# Patient Record
Sex: MALE | Race: WHITE | Employment: OTHER | URBAN - METROPOLITAN AREA
[De-identification: names, ages, dates, MRNs, and addresses within clinical notes are randomized per-mention and may not be internally consistent; named-entity substitution may affect disease eponyms.]

---

## 2017-07-12 ENCOUNTER — LAB ENCOUNTER (OUTPATIENT)
Dept: LAB | Age: 67
End: 2017-07-12
Attending: FAMILY MEDICINE
Payer: COMMERCIAL

## 2017-07-12 ENCOUNTER — OFFICE VISIT (OUTPATIENT)
Dept: FAMILY MEDICINE CLINIC | Facility: CLINIC | Age: 67
End: 2017-07-12

## 2017-07-12 VITALS
TEMPERATURE: 97 F | BODY MASS INDEX: 32.78 KG/M2 | HEART RATE: 64 BPM | HEIGHT: 72 IN | RESPIRATION RATE: 12 BRPM | SYSTOLIC BLOOD PRESSURE: 130 MMHG | DIASTOLIC BLOOD PRESSURE: 78 MMHG | WEIGHT: 242 LBS

## 2017-07-12 DIAGNOSIS — R73.9 HYPERGLYCEMIA: ICD-10-CM

## 2017-07-12 DIAGNOSIS — Z00.00 HEALTH CARE MAINTENANCE: ICD-10-CM

## 2017-07-12 DIAGNOSIS — Z00.00 HEALTH CARE MAINTENANCE: Primary | ICD-10-CM

## 2017-07-12 DIAGNOSIS — F17.200 SMOKER: ICD-10-CM

## 2017-07-12 LAB
ALBUMIN SERPL-MCNC: 3.9 G/DL (ref 3.5–4.8)
ALP LIVER SERPL-CCNC: 82 U/L (ref 45–117)
ALT SERPL-CCNC: 50 U/L (ref 17–63)
AST SERPL-CCNC: 24 U/L (ref 15–41)
BASOPHILS # BLD AUTO: 0.06 X10(3) UL (ref 0–0.1)
BASOPHILS NFR BLD AUTO: 1 %
BILIRUB SERPL-MCNC: 0.5 MG/DL (ref 0.1–2)
BILIRUB UR QL STRIP.AUTO: NEGATIVE
BUN BLD-MCNC: 20 MG/DL (ref 8–20)
CALCIUM BLD-MCNC: 8.4 MG/DL (ref 8.3–10.3)
CHLORIDE: 110 MMOL/L (ref 101–111)
CHOLEST SMN-MCNC: 141 MG/DL (ref ?–200)
CO2: 26 MMOL/L (ref 22–32)
COMPLEXED PSA SERPL-MCNC: 0.87 NG/ML (ref 0.01–4)
CREAT BLD-MCNC: 1.06 MG/DL (ref 0.7–1.3)
EOSINOPHIL # BLD AUTO: 0.17 X10(3) UL (ref 0–0.3)
EOSINOPHIL NFR BLD AUTO: 3 %
ERYTHROCYTE [DISTWIDTH] IN BLOOD BY AUTOMATED COUNT: 14.2 % (ref 11.5–16)
GLUCOSE BLD-MCNC: 118 MG/DL (ref 70–99)
GLUCOSE UR STRIP.AUTO-MCNC: NEGATIVE MG/DL
HCT VFR BLD AUTO: 46 % (ref 37–53)
HDLC SERPL-MCNC: 50 MG/DL (ref 45–?)
HDLC SERPL: 2.82 {RATIO} (ref ?–4.97)
HGB BLD-MCNC: 14.6 G/DL (ref 13–17)
IMMATURE GRANULOCYTE COUNT: 0.02 X10(3) UL (ref 0–1)
IMMATURE GRANULOCYTE RATIO %: 0.3 %
KETONES UR STRIP.AUTO-MCNC: NEGATIVE MG/DL
LDLC SERPL CALC-MCNC: 82 MG/DL (ref ?–130)
LEUKOCYTE ESTERASE UR QL STRIP.AUTO: NEGATIVE
LYMPHOCYTES # BLD AUTO: 1.58 X10(3) UL (ref 0.9–4)
LYMPHOCYTES NFR BLD AUTO: 27.5 %
M PROTEIN MFR SERPL ELPH: 7 G/DL (ref 6.1–8.3)
MCH RBC QN AUTO: 27.2 PG (ref 27–33.2)
MCHC RBC AUTO-ENTMCNC: 31.7 G/DL (ref 31–37)
MCV RBC AUTO: 85.7 FL (ref 80–99)
MONOCYTES # BLD AUTO: 0.46 X10(3) UL (ref 0.1–0.6)
MONOCYTES NFR BLD AUTO: 8 %
NEUTROPHIL ABS PRELIM: 3.46 X10 (3) UL (ref 1.3–6.7)
NEUTROPHILS # BLD AUTO: 3.46 X10(3) UL (ref 1.3–6.7)
NEUTROPHILS NFR BLD AUTO: 60.2 %
NITRITE UR QL STRIP.AUTO: NEGATIVE
NONHDLC SERPL-MCNC: 91 MG/DL (ref ?–130)
PH UR STRIP.AUTO: 5 [PH] (ref 4.5–8)
PLATELET # BLD AUTO: 187 10(3)UL (ref 150–450)
POTASSIUM SERPL-SCNC: 4.1 MMOL/L (ref 3.6–5.1)
PROT UR STRIP.AUTO-MCNC: NEGATIVE MG/DL
RBC # BLD AUTO: 5.37 X10(6)UL (ref 4.3–5.7)
RBC UR QL AUTO: NEGATIVE
RED CELL DISTRIBUTION WIDTH-SD: 43.8 FL (ref 35.1–46.3)
SODIUM SERPL-SCNC: 142 MMOL/L (ref 136–144)
SP GR UR STRIP.AUTO: 1.02 (ref 1–1.03)
TRIGLYCERIDES: 43 MG/DL (ref ?–150)
TSI SER-ACNC: 2.05 MIU/ML (ref 0.35–5.5)
UROBILINOGEN UR STRIP.AUTO-MCNC: <2 MG/DL
VLDL: 9 MG/DL (ref 5–40)
WBC # BLD AUTO: 5.8 X10(3) UL (ref 4–13)

## 2017-07-12 PROCEDURE — 90715 TDAP VACCINE 7 YRS/> IM: CPT | Performed by: FAMILY MEDICINE

## 2017-07-12 PROCEDURE — 80050 GENERAL HEALTH PANEL: CPT | Performed by: FAMILY MEDICINE

## 2017-07-12 PROCEDURE — 90471 IMMUNIZATION ADMIN: CPT | Performed by: FAMILY MEDICINE

## 2017-07-12 PROCEDURE — 99386 PREV VISIT NEW AGE 40-64: CPT | Performed by: FAMILY MEDICINE

## 2017-07-12 PROCEDURE — 93000 ELECTROCARDIOGRAM COMPLETE: CPT | Performed by: FAMILY MEDICINE

## 2017-07-12 PROCEDURE — 84153 ASSAY OF PSA TOTAL: CPT | Performed by: FAMILY MEDICINE

## 2017-07-12 PROCEDURE — 36415 COLL VENOUS BLD VENIPUNCTURE: CPT | Performed by: FAMILY MEDICINE

## 2017-07-12 PROCEDURE — 80061 LIPID PANEL: CPT | Performed by: FAMILY MEDICINE

## 2017-07-12 PROCEDURE — 81003 URINALYSIS AUTO W/O SCOPE: CPT | Performed by: FAMILY MEDICINE

## 2017-07-12 PROCEDURE — 83036 HEMOGLOBIN GLYCOSYLATED A1C: CPT | Performed by: FAMILY MEDICINE

## 2017-07-12 NOTE — PATIENT INSTRUCTIONS
EKG and blood test done today. Boostrix vaccine (tetanus) given today. Recommend colonoscopy for colon cancer screening.   Recommend sleep study (can be done here by seeing Dr. Nuha Shepherd)  Recommend stop smoking

## 2017-07-12 NOTE — PROGRESS NOTES
[address]  PROGRESS NOTE  Chief Complaint:   Patient presents with: Well Adult      HPI:   This is a 61year old male coming in for general wellness. Patient moved to to the area here from [facility] 6 years ago.   He has not h Ears, Nose, Throat:  Denies hearing loss, sneezing, congestion, runny nose or sore throat. INTEGUMENTARY:  Denies rashes, itching, skin lesion, or excessive skin dryness.   CARDIOVASCULAR:  Denies chest pain, chest pressure, chest discomfort, palpitations, cyanosis, few varicosities noted. Left ankle is mildly swollen from recent fracture last month. Genitalia: Normal testes and penis. No inguinal hernias. Rectal exam: Prostate is normal.  Stool is negative for occult blood. No masses.       ASSESSMENT A

## 2017-07-13 DIAGNOSIS — R73.9 HYPERGLYCEMIA: Primary | ICD-10-CM

## 2017-07-13 LAB
EST. AVERAGE GLUCOSE BLD GHB EST-MCNC: 120 MG/DL (ref 68–126)
HBA1C MFR BLD HPLC: 5.8 % (ref ?–5.7)

## 2017-07-14 ENCOUNTER — TELEPHONE (OUTPATIENT)
Dept: FAMILY MEDICINE CLINIC | Facility: CLINIC | Age: 67
End: 2017-07-14

## 2017-07-14 NOTE — TELEPHONE ENCOUNTER
----- Message from Cornelio Severe, MD sent at 7/14/2017  9:09 AM CDT -----  Labs are completely normal with exception of an FBS of 118. Additional A1c test done was 5.8 indicating that he has very early pre-diabetes.   He needs to watch diets and weight h

## 2017-11-27 PROBLEM — M76.62 ACHILLES TENDINITIS OF LEFT LOWER EXTREMITY: Status: ACTIVE | Noted: 2017-11-27

## 2018-03-07 ENCOUNTER — OFFICE VISIT (OUTPATIENT)
Dept: FAMILY MEDICINE CLINIC | Facility: CLINIC | Age: 68
End: 2018-03-07

## 2018-03-07 ENCOUNTER — TELEPHONE (OUTPATIENT)
Dept: FAMILY MEDICINE CLINIC | Facility: CLINIC | Age: 68
End: 2018-03-07

## 2018-03-07 VITALS
TEMPERATURE: 97 F | RESPIRATION RATE: 16 BRPM | SYSTOLIC BLOOD PRESSURE: 148 MMHG | DIASTOLIC BLOOD PRESSURE: 72 MMHG | HEIGHT: 74 IN | HEART RATE: 70 BPM | BODY MASS INDEX: 29.52 KG/M2 | WEIGHT: 230 LBS

## 2018-03-07 DIAGNOSIS — S76.212A GROIN STRAIN, LEFT, INITIAL ENCOUNTER: Primary | ICD-10-CM

## 2018-03-07 PROCEDURE — 99213 OFFICE O/P EST LOW 20 MIN: CPT | Performed by: FAMILY MEDICINE

## 2018-03-07 NOTE — TELEPHONE ENCOUNTER
Pt states he is having groin pain at the top of the thigh. Pt was cleaning out pipes and now has the groin pain. Pt has tried ice which helped a little. Pt has Meloxicam at home and can take for the pain if you think it will help.     Can pt take t

## 2018-03-07 NOTE — TELEPHONE ENCOUNTER
Pt has called and schedule appt for today.      Future Appointments  Date Time Provider Jarod Thomas   3/7/2018 3:00 PM Stephen Renteria MD [facility]

## 2018-03-07 NOTE — TELEPHONE ENCOUNTER
coming in for groin pain @ 430pm tomorrow  - is out of town. wondering what to do for the pain in the interm.     Please advise

## 2018-03-07 NOTE — TELEPHONE ENCOUNTER
I prefer patient try Tylenol and/or ibuprofen for the pain rather than meloxicam.  Agree with appointment tomorrow and emergency room if worsens.

## 2018-03-08 NOTE — PATIENT INSTRUCTIONS
Rest as much as possible. Limit walking and exertion. May use ice after work but at other times then use heat. Tylenol/ibuprofen as needed.   If no improvement over the next few weeks then orthopedic referral

## 2018-03-08 NOTE — PROGRESS NOTES
[facility] [place]  PROGRESS NOTE  Chief Complaint:   Patient presents with:  Pain: In groin area      HPI:   This is a 74year old male coming in for discomfort in the left groin for the past week.   No specific injury but stays active with his Esterase Urine Negative Negative   Microscopic Microscopic not indicated    -HEMOGLOBIN A1C   Result Value Ref Range   HgbA1C 5.8 (H) <5.7 %   Estimated Average Glucose 120 68 - 126 mg/dL   -CBC W/ DIFFERENTIAL   Result Value Ref Range   WBC 5.8 4.0 - 13. 0 respiratory, gastrointestinal, genitourinary, musculoskeletal, skin, or neurological sxs other than those described in the HPI      EXAM:   /72 (BP Location: Left arm, Patient Position: Sitting, Cuff Size: adult)   Pulse 70   Temp (!) 97.1 °F (36.2 ° Patient/Caregiver Education: There are no barriers to learning. Medical education done. Outcome: Patient verbalizes understanding. Patient is notified to call with any questions, complications, allergies, or worsening or changing symptoms.   Patient is to

## 2018-12-04 ENCOUNTER — TELEPHONE (OUTPATIENT)
Dept: FAMILY MEDICINE CLINIC | Facility: CLINIC | Age: 68
End: 2018-12-04

## 2018-12-04 NOTE — TELEPHONE ENCOUNTER
had employee physical in 2017, looking to have a medicare physical with dr before the end of the year, we have been told no more medicare physicals the rest of the year to push them to next, please advise

## 2018-12-21 ENCOUNTER — OFFICE VISIT (OUTPATIENT)
Dept: FAMILY MEDICINE CLINIC | Facility: CLINIC | Age: 68
End: 2018-12-21
Payer: MEDICARE

## 2018-12-21 ENCOUNTER — TELEPHONE (OUTPATIENT)
Dept: FAMILY MEDICINE CLINIC | Facility: CLINIC | Age: 68
End: 2018-12-21

## 2018-12-21 ENCOUNTER — LAB ENCOUNTER (OUTPATIENT)
Dept: LAB | Age: 68
End: 2018-12-21
Attending: FAMILY MEDICINE
Payer: MEDICARE

## 2018-12-21 VITALS
DIASTOLIC BLOOD PRESSURE: 64 MMHG | TEMPERATURE: 98 F | HEIGHT: 74 IN | HEART RATE: 60 BPM | WEIGHT: 228.5 LBS | BODY MASS INDEX: 29.33 KG/M2 | SYSTOLIC BLOOD PRESSURE: 132 MMHG

## 2018-12-21 DIAGNOSIS — N40.1 BENIGN PROSTATIC HYPERPLASIA WITH URINARY FREQUENCY: ICD-10-CM

## 2018-12-21 DIAGNOSIS — R73.03 PREDIABETES: ICD-10-CM

## 2018-12-21 DIAGNOSIS — Z00.00 HEALTH CARE MAINTENANCE: Primary | ICD-10-CM

## 2018-12-21 DIAGNOSIS — R35.0 BENIGN PROSTATIC HYPERPLASIA WITH URINARY FREQUENCY: ICD-10-CM

## 2018-12-21 DIAGNOSIS — R73.9 HYPERGLYCEMIA: ICD-10-CM

## 2018-12-21 DIAGNOSIS — K63.5 BENIGN COLON POLYP: ICD-10-CM

## 2018-12-21 PROCEDURE — G0008 ADMIN INFLUENZA VIRUS VAC: HCPCS | Performed by: FAMILY MEDICINE

## 2018-12-21 PROCEDURE — 81003 URINALYSIS AUTO W/O SCOPE: CPT

## 2018-12-21 PROCEDURE — 84443 ASSAY THYROID STIM HORMONE: CPT

## 2018-12-21 PROCEDURE — 36415 COLL VENOUS BLD VENIPUNCTURE: CPT

## 2018-12-21 PROCEDURE — 90653 IIV ADJUVANT VACCINE IM: CPT | Performed by: FAMILY MEDICINE

## 2018-12-21 PROCEDURE — 84153 ASSAY OF PSA TOTAL: CPT

## 2018-12-21 PROCEDURE — G0102 PROSTATE CA SCREENING; DRE: HCPCS | Performed by: FAMILY MEDICINE

## 2018-12-21 PROCEDURE — G0009 ADMIN PNEUMOCOCCAL VACCINE: HCPCS | Performed by: FAMILY MEDICINE

## 2018-12-21 PROCEDURE — 85025 COMPLETE CBC W/AUTO DIFF WBC: CPT

## 2018-12-21 PROCEDURE — G0439 PPPS, SUBSEQ VISIT: HCPCS | Performed by: FAMILY MEDICINE

## 2018-12-21 PROCEDURE — 83036 HEMOGLOBIN GLYCOSYLATED A1C: CPT

## 2018-12-21 PROCEDURE — 90670 PCV13 VACCINE IM: CPT | Performed by: FAMILY MEDICINE

## 2018-12-21 PROCEDURE — 80053 COMPREHEN METABOLIC PANEL: CPT

## 2018-12-21 NOTE — PROGRESS NOTES
[address]  PROGRESS NOTE  Chief Complaint:   Patient presents with: Well Adult      HPI:   This is a 79year old male coming in for general health checkup.   Patient feels well in general with the exception that he has noted increased i Negative Negative mg/dl    Urobilinogen Urine <2.0 0.2 - 2.0 mg/dL    Nitrite Urine Negative Negative    Leukocyte Esterase Urine Negative Negative    Microscopic Microscopic not indicated    HEMOGLOBIN A1C   Result Value Ref Range    HgbA1C 5.8 (H) <5.7 % Answered       REVIEW OF SYSTEMS:   CONSTITUTIONAL:  Denies unusual weight gain/loss,  EENT:  Eyes:  Denies eye pain, visual loss, blurred vision, double vision or yellow sclerae.  Ears, Nose, Throat:  Denies hearing loss, sneezing, congestion, runny nose o lesion, no bruising, good turgor. HEART:  Regular rate and rhythm, no murmurs, rubs or gallops. LUNGS: Clear to auscultation bilterally, no rales/rhonchi/wheezing. CHEST: No tenderness.   ABDOMEN:  Soft, nondistended, nontender, no masses, no hepatosplen learning. Medical education done. Outcome: Patient verbalizes understanding. Patient is notified to call with any questions, complications, allergies, or worsening or changing symptoms.   Patient is to call with any side effects or complications from the

## 2018-12-22 ENCOUNTER — TELEPHONE (OUTPATIENT)
Dept: FAMILY MEDICINE CLINIC | Facility: CLINIC | Age: 68
End: 2018-12-22

## 2018-12-22 NOTE — TELEPHONE ENCOUNTER
----- Message from Maurice Lozano MD sent at 12/22/2018  8:33 AM CST -----  Please inform patient that his A1c remains slightly elevated at 6.0. His glucose level is elevated at 110. Rest of the labs are okay.   Recommend to follow-up with PCP for further

## 2018-12-22 NOTE — TELEPHONE ENCOUNTER
Informed pt of his blood work results. Pt expressed understanding and thanks. Placed copies of blood work at front dest for .

## 2018-12-26 NOTE — TELEPHONE ENCOUNTER
See other phone note 12/22. Lab results were discussed with patient and copies of labs placed up front for .

## 2019-07-20 ENCOUNTER — LAB ENCOUNTER (OUTPATIENT)
Dept: LAB | Age: 69
End: 2019-07-20
Attending: FAMILY MEDICINE
Payer: MEDICARE

## 2019-07-20 DIAGNOSIS — Z20.2 POSSIBLE EXPOSURE TO STD: ICD-10-CM

## 2019-07-20 LAB
HBV SURFACE AB SER QL: NONREACTIVE
HBV SURFACE AB SERPL IA-ACNC: 3.39 MIU/ML
HBV SURFACE AG SER-ACNC: <0.1 [IU]/L
HBV SURFACE AG SERPL QL IA: NONREACTIVE
HCV AB SERPL QL IA: NONREACTIVE
T PALLIDUM AB SER QL IA: NONREACTIVE

## 2019-07-20 PROCEDURE — 36415 COLL VENOUS BLD VENIPUNCTURE: CPT

## 2019-07-20 PROCEDURE — 87340 HEPATITIS B SURFACE AG IA: CPT

## 2019-07-20 PROCEDURE — 87070 CULTURE OTHR SPECIMN AEROBIC: CPT | Performed by: FAMILY MEDICINE

## 2019-07-20 PROCEDURE — 87077 CULTURE AEROBIC IDENTIFY: CPT | Performed by: FAMILY MEDICINE

## 2019-07-20 PROCEDURE — 87205 SMEAR GRAM STAIN: CPT | Performed by: FAMILY MEDICINE

## 2019-07-20 PROCEDURE — 87491 CHLMYD TRACH DNA AMP PROBE: CPT | Performed by: FAMILY MEDICINE

## 2019-07-20 PROCEDURE — 87591 N.GONORRHOEAE DNA AMP PROB: CPT | Performed by: FAMILY MEDICINE

## 2019-07-20 PROCEDURE — 86803 HEPATITIS C AB TEST: CPT

## 2019-07-20 PROCEDURE — 86780 TREPONEMA PALLIDUM: CPT

## 2019-07-20 PROCEDURE — 87389 HIV-1 AG W/HIV-1&-2 AB AG IA: CPT

## 2019-07-20 PROCEDURE — 86706 HEP B SURFACE ANTIBODY: CPT

## 2019-07-20 NOTE — PROGRESS NOTES
[facility] [place]  PROGRESS NOTE        HPI:   This is a 79year old male coming in for Patient presents with: Other: Sores on roof of mouth for the past 5 days    HPI  Has some sores on the roof of his mouth for hte pa st 5 days.   He didn't r 126 mg/dL   COMP METABOLIC PANEL (14)   Result Value Ref Range    Glucose 110 (H) 70 - 99 mg/dL    Sodium 140 136 - 144 mmol/L    Potassium 4.2 3.6 - 5.1 mmol/L    Chloride 108 101 - 111 mmol/L    CO2 27.0 22.0 - 32.0 mmol/L    Anion Gap 5 0 - 18 mmol/L History    Tobacco Use      Smoking status: Current Some Day Smoker        Types: Cigars      Smokeless tobacco: Never Used    Alcohol use: No    Drug use: No    Family History:  Family History   Problem Relation Age of Onset   • No Known Problems Father oriented to person, place, and time. He appears well-developed and well-nourished. HENT:   Head: Normocephalic and atraumatic.    Right Ear: External ear normal.   Left Ear: External ear normal.   Mouth/Throat: Oropharynx is clear and moist.   Oral apthou Russell Marshall MD  7/20/2019  10:02 AM  Immunization History   Administered Date(s) Administered   • FLUAD High Dose 65 yr and older (49540) 12/21/2018   • Pneumococcal (Prevnar 13) 12/21/2018   • TDAP 07/12/2017       Most Recent Immunizations  Admin

## 2019-07-22 ENCOUNTER — TELEPHONE (OUTPATIENT)
Dept: FAMILY MEDICINE CLINIC | Facility: CLINIC | Age: 69
End: 2019-07-22

## 2019-07-22 NOTE — TELEPHONE ENCOUNTER
Pt returned call and informed of below results in regards to all labs completed/resulted on 7/20/19. Pt verbalized understanding and had no other questions at this time.

## 2019-07-22 NOTE — TELEPHONE ENCOUNTER
----- Message from Merissa Toro MD sent at 7/21/2019  9:40 PM CDT -----  normal results, please notify patient.    Awaiting gc

## 2019-07-22 NOTE — TELEPHONE ENCOUNTER
----- Message from Gianfranco Dooley MD sent at 7/21/2019  9:42 PM CDT -----  Normal (GC, and oral culture) results,   please notify patient.

## 2019-07-24 ENCOUNTER — TELEPHONE (OUTPATIENT)
Dept: FAMILY MEDICINE CLINIC | Facility: CLINIC | Age: 69
End: 2019-07-24

## 2019-12-26 ENCOUNTER — APPOINTMENT (OUTPATIENT)
Dept: LAB | Age: 69
End: 2019-12-26
Attending: FAMILY MEDICINE
Payer: MEDICARE

## 2019-12-26 ENCOUNTER — OFFICE VISIT (OUTPATIENT)
Dept: FAMILY MEDICINE CLINIC | Facility: CLINIC | Age: 69
End: 2019-12-26
Payer: MEDICARE

## 2019-12-26 VITALS
TEMPERATURE: 98 F | HEIGHT: 74 IN | RESPIRATION RATE: 20 BRPM | DIASTOLIC BLOOD PRESSURE: 68 MMHG | HEART RATE: 72 BPM | WEIGHT: 223 LBS | SYSTOLIC BLOOD PRESSURE: 134 MMHG | OXYGEN SATURATION: 97 % | BODY MASS INDEX: 28.62 KG/M2

## 2019-12-26 DIAGNOSIS — Z23 NEED FOR VACCINATION: ICD-10-CM

## 2019-12-26 DIAGNOSIS — Z13.31 DEPRESSION SCREENING: ICD-10-CM

## 2019-12-26 DIAGNOSIS — Z13.6 SCREENING FOR CARDIOVASCULAR CONDITION: ICD-10-CM

## 2019-12-26 DIAGNOSIS — Z12.5 PROSTATE CANCER SCREENING: ICD-10-CM

## 2019-12-26 DIAGNOSIS — Z00.00 ENCOUNTER FOR ANNUAL HEALTH EXAMINATION: ICD-10-CM

## 2019-12-26 DIAGNOSIS — Z00.00 ENCOUNTER FOR MEDICARE ANNUAL WELLNESS EXAM: Primary | ICD-10-CM

## 2019-12-26 DIAGNOSIS — Z13.1 SCREENING FOR DIABETES MELLITUS (DM): ICD-10-CM

## 2019-12-26 DIAGNOSIS — Z12.11 COLON CANCER SCREENING: ICD-10-CM

## 2019-12-26 DIAGNOSIS — Z23 NEEDS FLU SHOT: ICD-10-CM

## 2019-12-26 DIAGNOSIS — R73.03 PREDIABETES: ICD-10-CM

## 2019-12-26 PROCEDURE — G0008 ADMIN INFLUENZA VIRUS VAC: HCPCS | Performed by: FAMILY MEDICINE

## 2019-12-26 PROCEDURE — G0439 PPPS, SUBSEQ VISIT: HCPCS | Performed by: FAMILY MEDICINE

## 2019-12-26 PROCEDURE — 83036 HEMOGLOBIN GLYCOSYLATED A1C: CPT | Performed by: FAMILY MEDICINE

## 2019-12-26 PROCEDURE — G0444 DEPRESSION SCREEN ANNUAL: HCPCS | Performed by: FAMILY MEDICINE

## 2019-12-26 PROCEDURE — 90732 PPSV23 VACC 2 YRS+ SUBQ/IM: CPT | Performed by: FAMILY MEDICINE

## 2019-12-26 PROCEDURE — 80053 COMPREHEN METABOLIC PANEL: CPT | Performed by: FAMILY MEDICINE

## 2019-12-26 PROCEDURE — 85025 COMPLETE CBC W/AUTO DIFF WBC: CPT | Performed by: FAMILY MEDICINE

## 2019-12-26 PROCEDURE — 36415 COLL VENOUS BLD VENIPUNCTURE: CPT | Performed by: FAMILY MEDICINE

## 2019-12-26 PROCEDURE — 80061 LIPID PANEL: CPT | Performed by: FAMILY MEDICINE

## 2019-12-26 PROCEDURE — G0009 ADMIN PNEUMOCOCCAL VACCINE: HCPCS | Performed by: FAMILY MEDICINE

## 2019-12-26 PROCEDURE — 90662 IIV NO PRSV INCREASED AG IM: CPT | Performed by: FAMILY MEDICINE

## 2019-12-26 NOTE — PROGRESS NOTES
HPI:   Saw Dominguez is a 81year old male who presents for a Medicare Subsequent Annual Wellness visit (Pt already had Initial Annual Wellness). Patient has history of prediabetes, currently denies any symptoms of diabetes.   He has been watching carb tobacco cessation counseling today.  (update Vitals or Tob Hx section to [place] Tobacco Cessation counseling given as YES and refresh this link)        CAGE Alcohol screening   Darylene Gunther was screened for Alcohol abuse and had a score of 0 so is at low risk pain or ST  LUNGS: denies shortness of breath with exertion  CARDIOVASCULAR: denies chest pain on exertion  GI: denies abdominal pain, denies heartburn  : 2 per night nocturia, no complaint of urinary incontinence  MUSCULOSKELETAL: denies back pain  NEUR oriented x 3; affect appropriate       Vaccination History     Immunization History   Administered Date(s) Administered   • FLUAD High Dose 65 yr and older (98739) 12/21/2018   • Pneumococcal (Prevnar 13) 12/21/2018   • TDAP 07/12/2017   Pended Date(s) Pen do you maintain positive mental well-being?: Social Interaction; Visiting Family; Visiting Friends    This section provided for quick review of chart, separate sheet to patient  [address] [place] [address],[address] Internal Lab or Procedure Extern Illicit injectable drug abusers     Tetanus Toxoid  Only covered with a cut with metal- TD and TDaP Not covered by Medicare Part B) No vaccine history found This may be covered with your prescription benefits, but Medicare does not cover unless Medically

## 2019-12-26 NOTE — PATIENT INSTRUCTIONS
Advice low carb in diet, AVOID FOOD WITH HIGH GLYCEMIC INDEX, have smaller portion meal, avoid bread, pasta and potatoes, no juice or soda, don't eat late at night, exercise,  and weight loss. Pneumovax and flu shot today. Check labs today.        Samanta Galo one of the following criteria:   • Men who are 73-68 years old and have smoked more than 100 cigarettes in their lifetime   • Anyone with a family history    Colorectal Cancer Screening Covered up to Age 76     Colonoscopy Screen   Covered every 10 years- the same house as a HepB virus carrier   Homosexual men   Illicit injectable drug abusers     Tetanus Toxoid- Only covered with a cut with metal- TD and TDaP Not covered by Medicare Part B) Orders placed or performed in visit on 07/12/17   • TETANUS, DIPHT

## 2019-12-27 ENCOUNTER — TELEPHONE (OUTPATIENT)
Dept: FAMILY MEDICINE CLINIC | Facility: CLINIC | Age: 69
End: 2019-12-27

## 2019-12-27 NOTE — TELEPHONE ENCOUNTER
----- Message from Nghia Schultz MD sent at 12/26/2019  5:45 PM CST -----  Please inform patient that his A1c has gone up slightly to 6.1, his average glucose is running around 128.   He remains in the prediabetes range, recommend strict low-carb diet, exer

## 2020-06-15 ENCOUNTER — TELEPHONE (OUTPATIENT)
Dept: FAMILY MEDICINE CLINIC | Facility: CLINIC | Age: 70
End: 2020-06-15

## 2020-06-15 NOTE — TELEPHONE ENCOUNTER
Pt had been working on a [address] [employer] [address]. Pt states he was using a larger tool called a sawzall, states he was exerting more force and pushing. Pt was working with [employer]. Pt also was working on garage sale over the weekend. Pt c/o right sided rib pain.   Pt d

## 2020-06-15 NOTE — TELEPHONE ENCOUNTER
Patient states he is having a sharp pain in his back, states he believes he pulled a muscle.  Offered an appt for today or tomorrow but states he would like to talk to a nurse first.

## 2020-08-26 ENCOUNTER — OFFICE VISIT (OUTPATIENT)
Dept: FAMILY MEDICINE CLINIC | Facility: CLINIC | Age: 70
End: 2020-08-26
Payer: MEDICARE

## 2020-08-26 VITALS
SYSTOLIC BLOOD PRESSURE: 110 MMHG | BODY MASS INDEX: 27.85 KG/M2 | HEART RATE: 60 BPM | HEIGHT: 74 IN | OXYGEN SATURATION: 98 % | RESPIRATION RATE: 18 BRPM | TEMPERATURE: 97 F | WEIGHT: 217 LBS | DIASTOLIC BLOOD PRESSURE: 60 MMHG

## 2020-08-26 DIAGNOSIS — M79.651 ACUTE THIGH PAIN, RIGHT: Primary | ICD-10-CM

## 2020-08-26 PROBLEM — H69.81 DYSFUNCTION OF RIGHT EUSTACHIAN TUBE: Status: ACTIVE | Noted: 2019-08-19

## 2020-08-26 PROCEDURE — 99214 OFFICE O/P EST MOD 30 MIN: CPT | Performed by: NURSE PRACTITIONER

## 2020-08-26 RX ORDER — PREDNISONE 10 MG/1
TABLET ORAL
Qty: 21 TABLET | Refills: 0 | Status: SHIPPED | OUTPATIENT
Start: 2020-08-26 | End: 2020-09-04

## 2020-08-26 NOTE — PROGRESS NOTES
CHIEF COMPLAINT:     Patient presents with:  Leg Pain  Sciatica: ?       HPI:   Anna Gonzalez is a 81year old male who is here for complaints of right hip    Pain was precipitated by unknown though thinks it was from sitting on a bucket at work a few months pain.  MUSCULOSKELETAL: Per HPI. No other joints are affected  NEURO: No numbness or tingling. No loss of bowel or bladder control.     EXAM:   /60   Pulse 60   Temp 97.2 °F (36.2 °C)   Resp 18   Ht 74\"   Wt 217 lb (98.4 kg)   SpO2 98%   BMI 27.86 medication explained and discussed. There are no Patient Instructions on file for this visit. The patient indicates understanding of these issues and agrees to the plan. The patient is asked to return if sx's persist or worsen.     This note was c

## 2020-08-26 NOTE — PATIENT INSTRUCTIONS
Prednisone taper as ordered, take with food. Epsom salt warm water soaks twice a day. Ice application four times a day to the back of the leg. Notify the office if no significant improvement by Monday, will proceed with xray of right hip as requested.

## 2020-09-04 ENCOUNTER — TELEPHONE (OUTPATIENT)
Dept: FAMILY MEDICINE CLINIC | Facility: CLINIC | Age: 70
End: 2020-09-04

## 2020-09-04 NOTE — TELEPHONE ENCOUNTER
Patient states that he is at his last pill of prednisone, states that he's not feeling any better. Would like a call back.

## 2020-09-04 NOTE — TELEPHONE ENCOUNTER
Recommend patient be seen in urgent care versus ER for possible imaging especially in the event of pain severity. Has already done 9 days of steroid taper for inflammation and symptoms now worse.

## 2021-03-11 ENCOUNTER — TELEPHONE (OUTPATIENT)
Dept: FAMILY MEDICINE CLINIC | Facility: CLINIC | Age: 71
End: 2021-03-11

## 2021-03-11 ENCOUNTER — PATIENT OUTREACH (OUTPATIENT)
Dept: FAMILY MEDICINE CLINIC | Facility: CLINIC | Age: 71
End: 2021-03-11

## 2021-03-11 NOTE — PROGRESS NOTES
LM to c/b and schedule 2021 Wellness exam.     \"Meg,    This is [place] at [employer]. [facility], if you could please give our office a call back our phone number is [phone]. Thank you! \"

## 2021-03-12 DIAGNOSIS — Z23 NEED FOR VACCINATION: ICD-10-CM

## 2021-06-17 ENCOUNTER — PATIENT OUTREACH (OUTPATIENT)
Dept: FAMILY MEDICINE CLINIC | Facility: CLINIC | Age: 71
End: 2021-06-17

## 2021-06-17 NOTE — PROGRESS NOTES
LM to c/b and schedule 2021 Wellness exam.     \"Meg,    This is [place]etta at [facility]. I was calling for [facility], if you could please give our office a call back our phone number is [phone]. Thank you! \"      Letter Sent

## 2022-03-23 ENCOUNTER — PATIENT OUTREACH (OUTPATIENT)
Dept: FAMILY MEDICINE CLINIC | Facility: CLINIC | Age: 72
End: 2022-03-23

## 2022-06-15 ENCOUNTER — PATIENT OUTREACH (OUTPATIENT)
Dept: FAMILY MEDICINE CLINIC | Facility: CLINIC | Age: 72
End: 2022-06-15

## 2022-09-28 ENCOUNTER — PATIENT OUTREACH (OUTPATIENT)
Dept: FAMILY MEDICINE CLINIC | Facility: CLINIC | Age: 72
End: 2022-09-28

## 2022-09-28 NOTE — PROGRESS NOTES
mailed letter to patient to help schedule medicare wellness exam, it was returned undeliverable as addressed

## (undated) NOTE — LETTER
6/17/2021  Isabel Smith  [address] [place] [facility] [address]    We take each of our patient's health very seriously and the key to maintaining your health is an annual wellness physical.  Review of your medical records shows that it is time for your annual wel